# Patient Record
Sex: FEMALE | Race: WHITE | Employment: FULL TIME | URBAN - METROPOLITAN AREA
[De-identification: names, ages, dates, MRNs, and addresses within clinical notes are randomized per-mention and may not be internally consistent; named-entity substitution may affect disease eponyms.]

---

## 2020-08-07 ENCOUNTER — OFFICE VISIT (OUTPATIENT)
Dept: SURGERY | Age: 60
End: 2020-08-07
Payer: COMMERCIAL

## 2020-08-07 ENCOUNTER — HOSPITAL ENCOUNTER (OUTPATIENT)
Age: 60
Discharge: HOME | End: 2020-08-09
Payer: COMMERCIAL

## 2020-08-07 VITALS
HEIGHT: 62 IN | WEIGHT: 195 LBS | RESPIRATION RATE: 20 BRPM | SYSTOLIC BLOOD PRESSURE: 138 MMHG | DIASTOLIC BLOOD PRESSURE: 88 MMHG | OXYGEN SATURATION: 98 % | BODY MASS INDEX: 35.88 KG/M2 | TEMPERATURE: 97.7 F | HEART RATE: 75 BPM

## 2020-08-07 PROCEDURE — 11440 EXC FACE-MM B9+MARG 0.5 CM/<: CPT | Performed by: PLASTIC SURGERY

## 2020-08-07 PROCEDURE — G8427 DOCREV CUR MEDS BY ELIG CLIN: HCPCS | Performed by: PLASTIC SURGERY

## 2020-08-07 PROCEDURE — G8417 CALC BMI ABV UP PARAM F/U: HCPCS | Performed by: PLASTIC SURGERY

## 2020-08-07 PROCEDURE — 3017F COLORECTAL CA SCREEN DOC REV: CPT | Performed by: PLASTIC SURGERY

## 2020-08-07 PROCEDURE — 88305 TISSUE EXAM BY PATHOLOGIST: CPT

## 2020-08-07 PROCEDURE — 99204 OFFICE O/P NEW MOD 45 MIN: CPT | Performed by: PLASTIC SURGERY

## 2020-08-07 PROCEDURE — 1036F TOBACCO NON-USER: CPT | Performed by: PLASTIC SURGERY

## 2020-08-07 PROCEDURE — 88312 SPECIAL STAINS GROUP 1: CPT

## 2020-08-07 RX ORDER — LIDOCAINE HYDROCHLORIDE AND EPINEPHRINE 10; 10 MG/ML; UG/ML
3 INJECTION, SOLUTION INFILTRATION; PERINEURAL ONCE
Status: COMPLETED | OUTPATIENT
Start: 2020-08-07 | End: 2020-08-07

## 2020-08-07 RX ORDER — AMLODIPINE BESYLATE 5 MG/1
5 TABLET ORAL DAILY
COMMUNITY

## 2020-08-07 RX ADMIN — LIDOCAINE HYDROCHLORIDE AND EPINEPHRINE 3 ML: 10; 10 INJECTION, SOLUTION INFILTRATION; PERINEURAL at 13:44

## 2020-08-07 NOTE — PROGRESS NOTES
Department of Plastic Surgery - Adult  Attending Consult Note      CHIEF COMPLAINT:  Lesion of left upper eyelid    History Obtained From:  patient    HISTORY OF PRESENT ILLNESS:                The patient is a 62 y.o. female who presents with pigmented growing lesion of left upper eyelid. The patient states that they first noticed the lesion several months ago. It has  grown in size since they first noticed the lesion. The lesion has  changed in color and has not had discharge or bleeding. The pt ha had the lesion biopsied previously. The patient states that it weighs down her eyelid and causes some irritation. She can definitely see it in her line of vision. Past Medical History:    Past Medical History:   Diagnosis Date    Graves disease     HTN (hypertension)     Hypothyroid      Past Surgical History:    Past Surgical History:   Procedure Laterality Date    HYSTERECTOMY  2010? Current Medications:      Current Outpatient Medications   Medication Sig Dispense Refill    amLODIPine (NORVASC) 5 MG tablet Take 5 mg by mouth daily      methimazole (TAPAZOLE) 5 MG tablet Take 5 mg by mouth 3 times daily 5mg tablet five times weekly       No current facility-administered medications for this visit. Allergies:  Erythromycin-sulfisoxazole; Pcn [penicillins]; and Sulfa antibiotics    Social History:   Social History     Socioeconomic History    Marital status:      Spouse name: Not on file    Number of children: Not on file    Years of education: Not on file    Highest education level: Not on file   Occupational History    Not on file   Social Needs    Financial resource strain: Not on file    Food insecurity     Worry: Not on file     Inability: Not on file    Transportation needs     Medical: Not on file     Non-medical: Not on file   Tobacco Use    Smoking status: Never Smoker    Smokeless tobacco: Never Used   Substance and Sexual Activity    Alcohol use: No    Drug use:  No  Sexual activity: Not on file   Lifestyle    Physical activity     Days per week: Not on file     Minutes per session: Not on file    Stress: Not on file   Relationships    Social connections     Talks on phone: Not on file     Gets together: Not on file     Attends Alevism service: Not on file     Active member of club or organization: Not on file     Attends meetings of clubs or organizations: Not on file     Relationship status: Not on file    Intimate partner violence     Fear of current or ex partner: Not on file     Emotionally abused: Not on file     Physically abused: Not on file     Forced sexual activity: Not on file   Other Topics Concern    Not on file   Social History Narrative    Not on file     Family History:   Family History   Problem Relation Age of Onset    Heart Failure Mother        REVIEW OF SYSTEMS:    CONSTITUTIONAL:  negative for  fevers, chills, sweats and fatigue  EYES: negative for dipolpia or acute vision loss. RESPIRATORY:  negative for  dry cough, cough with sputum, dyspnea, wheezing and chest pain  HENT:negative for pain, headache, difficulty swallowing or nose bleeds. CARDIOVASCULAR:  negative for  chest pain, dyspnea, palpitations, syncope  GASTROINTESTINAL:  negative for nausea, vomiting, change in bowel habits, diarrhea, constipation and abdominal pain  EXTREMITIES: negative for edema  MUSCULOSKELETAL: negative for muscle weakness  SKIN: positive for lesion,negative for itching or rashes.   HEME: negative for easy brusing or bleeding  BEHAVIOR/PSYCH:  negative for poor appetite, increased appetite, decreased sleep and poor concentration      PHYSICAL EXAM:        VITALS:  /88 (Site: Left Upper Arm, Position: Sitting, Cuff Size: Medium Adult)   Pulse 75   Temp 97.7 °F (36.5 °C) (Temporal)   Resp 20   Ht 5' 2\" (1.575 m)   Wt 195 lb (88.5 kg)   SpO2 98%   Breastfeeding No   BMI 35.67 kg/m²   CONSTITUTIONAL:  awake, alert, cooperative, no apparent distress, and appears stated age  EYES: PERRLA, EOMI, no signs of occular infection  LUNGS:  No increased work of breathing, good air exchange  CARDIOVASCULAR:  regular rate and rhythm   EXTREMITIES: no signs of clubbing or cyanosis. MUSCULOSKELETAL: negative for flaccid muscle tone or spastic movements. NEURO: Cranial nerves II-XII grossly intact. No signs of agitated mood. SKIN: Left upper eyelid-  2mm x 3mm,  pigmented in color, irregular border, raised, no signs of bleeding,drainage or infection. Non tender to palpation. verrucous      DATA:    Labs: CBC: No results found for: WBC, RBC, HGB, HCT, MCV, MCH, MCHC, RDW, PLT, MPV  BMP:  No results found for: NA, K, CL, CO2, BUN, LABALBU, CREATININE, CALCIUM, GFRAA, LABGLOM, GLUCOSE    Radiology Review:  No radiology needed at this time  Pathology Review: previous Pathology reviewed       IMPRESSION/RECOMMENDATIONS:        Diagnosis  -) Pigmented raised lesion of uncertain behavior of left upper eyelid, symptomatic    -We will plan to proceed and have the excisionally biopsied.    -The risks, benefits and options were discussed with the pt. The risks included but not limited to pain, bleeding, infection, heavy scarring, damage to surrounding structures, fluid collections, asymmetry, and need for further procedures. All of Her questions were answered to their satisfaction and She agrees to proceed with the procedure.    -After consent was obtained, the area was cleansed with an alcohol swab. Local anesthesia consisting of 1% lidocaine with 1:100,000 epinepherine was injected  surrounding the area. The local was allowed to work. An iris was used to excise the lesion. Simple closure was performed with 5-0 plain gut.   The procedure was performed by Me    I have discussed with the patient that they should make every attempt to follow-up within this time interval in the event that the pathology or radiographic findings require further attention such as surgical or other medical intervention. They voiced complete understanding. The patient was educated to keep the bandage in place and apply bacitracin to the wound site BID. OK to shower at this time. Advised the patient that they can allow soap and water to rinse of the wound site while showering. Once they are done in the shower they are to pat dry the incision site with a clean paper towel. No baths, hot tubs or soaking of the wound site at this time. Pt voices understanding. Photos obtained    FU- 7-14 days      This document is generated, in part, by voice recognition software and thus  syntax and grammatical errors are possible.     Dotty Banks  12:10 PM  8/7/2020

## 2020-08-14 ENCOUNTER — OFFICE VISIT (OUTPATIENT)
Dept: SURGERY | Age: 60
End: 2020-08-14
Payer: COMMERCIAL

## 2020-08-14 VITALS
DIASTOLIC BLOOD PRESSURE: 76 MMHG | HEIGHT: 62 IN | SYSTOLIC BLOOD PRESSURE: 126 MMHG | BODY MASS INDEX: 35.88 KG/M2 | WEIGHT: 195 LBS | TEMPERATURE: 99.3 F | OXYGEN SATURATION: 98 %

## 2020-08-14 PROCEDURE — 99211 OFF/OP EST MAY X REQ PHY/QHP: CPT | Performed by: PHYSICIAN ASSISTANT

## 2020-08-14 PROCEDURE — G8417 CALC BMI ABV UP PARAM F/U: HCPCS | Performed by: PHYSICIAN ASSISTANT

## 2020-08-14 PROCEDURE — G8427 DOCREV CUR MEDS BY ELIG CLIN: HCPCS | Performed by: PHYSICIAN ASSISTANT

## 2020-08-14 NOTE — PROGRESS NOTES
Subjective: Follow up today from shave biopsy left eyelid lesion. Denies fever, nausea, vomiting, leg pain or swelling, pain is absent. The pt states that they have  kept the wound site(s) covered with bacitracin ointment. They voice no complaints. Objective: There were no vitals taken for this visit. Wound: Clean, dry, and intact, no drainage.   No signs of infection, wound healing well      Assessment:    Patient Active Problem List   Diagnosis    Pigmented skin lesion       Labs: CBC: No results found for: WBC, RBC, HGB, HCT, MCV, MCH, MCHC, RDW, PLT, MPV  BMP:  No results found for: NA, K, CL, CO2, BUN, LABALBU, CREATININE, CALCIUM, GFRAA, LABGLOM, GLUCOSE      Pathology Report- [address], [address] [place] [address]               FINAL SURGICAL PATHOLOGY REPORT     NAME:            ARMANDO BUTTS        Date of       08/07/2020                                            Collection:   Medical Record   [id number]              Date of       08/10/2020   Number:                                  Receipt:   Age:  57 Y        Sex:  F                Date          08/13/2020 14:40                                            Reported:   Date Of Birth:   1960   Financial        [id number]             Admitting     IGNACIO LEHMAN   Number:                                  Physician:   Patient          HEPIC                   Ordering      IGNACIO LEHMAN   Location:                                Physician:     Accession Number:  [id number]                                           Additional Physicians:ADRIENNE

## 2021-09-09 NOTE — PROGRESS NOTES
PATIENT NOTIFIED OF TIME CHANGE FOR PROCEDURE TO ARRIVE AT 0830 FOR 0930 PROCEDURE WITH DR. Candy Hopkins.

## 2021-09-10 ENCOUNTER — HOSPITAL ENCOUNTER (OUTPATIENT)
Age: 61
Setting detail: OUTPATIENT SURGERY
Discharge: HOME | End: 2021-09-10
Attending: SURGERY | Admitting: SURGERY
Payer: COMMERCIAL

## 2021-09-10 VITALS
RESPIRATION RATE: 16 BRPM | SYSTOLIC BLOOD PRESSURE: 104 MMHG | BODY MASS INDEX: 38.14 KG/M2 | DIASTOLIC BLOOD PRESSURE: 60 MMHG | OXYGEN SATURATION: 95 % | HEIGHT: 61 IN | HEART RATE: 70 BPM | TEMPERATURE: 98.5 F | WEIGHT: 202 LBS

## 2021-09-10 PROCEDURE — 99153 MOD SED SAME PHYS/QHP EA: CPT | Performed by: SURGERY

## 2021-09-10 PROCEDURE — 7100000010 HC PHASE II RECOVERY - FIRST 15 MIN: Performed by: SURGERY

## 2021-09-10 PROCEDURE — 7100000011 HC PHASE II RECOVERY - ADDTL 15 MIN: Performed by: SURGERY

## 2021-09-10 PROCEDURE — 2580000003 HC RX 258: Performed by: SURGERY

## 2021-09-10 PROCEDURE — 6360000002 HC RX W HCPCS: Performed by: SURGERY

## 2021-09-10 PROCEDURE — 2709999900 HC NON-CHARGEABLE SUPPLY: Performed by: SURGERY

## 2021-09-10 PROCEDURE — 3609027000 HC COLONOSCOPY: Performed by: SURGERY

## 2021-09-10 PROCEDURE — 99152 MOD SED SAME PHYS/QHP 5/>YRS: CPT | Performed by: SURGERY

## 2021-09-10 RX ORDER — SODIUM CHLORIDE 0.9 % (FLUSH) 0.9 %
5-40 SYRINGE (ML) INJECTION EVERY 12 HOURS SCHEDULED
Status: DISCONTINUED | OUTPATIENT
Start: 2021-09-10 | End: 2021-09-10 | Stop reason: HOSPADM

## 2021-09-10 RX ORDER — MIDAZOLAM HYDROCHLORIDE 1 MG/ML
INJECTION INTRAMUSCULAR; INTRAVENOUS PRN
Status: DISCONTINUED | OUTPATIENT
Start: 2021-09-10 | End: 2021-09-10 | Stop reason: ALTCHOICE

## 2021-09-10 RX ORDER — MEPERIDINE HYDROCHLORIDE 50 MG/ML
INJECTION INTRAMUSCULAR; INTRAVENOUS; SUBCUTANEOUS PRN
Status: DISCONTINUED | OUTPATIENT
Start: 2021-09-10 | End: 2021-09-10 | Stop reason: ALTCHOICE

## 2021-09-10 RX ORDER — SODIUM CHLORIDE 0.9 % (FLUSH) 0.9 %
5-40 SYRINGE (ML) INJECTION PRN
Status: DISCONTINUED | OUTPATIENT
Start: 2021-09-10 | End: 2021-09-10 | Stop reason: HOSPADM

## 2021-09-10 RX ORDER — SODIUM CHLORIDE 9 MG/ML
INJECTION, SOLUTION INTRAVENOUS CONTINUOUS
Status: DISCONTINUED | OUTPATIENT
Start: 2021-09-10 | End: 2021-09-10 | Stop reason: HOSPADM

## 2021-09-10 RX ORDER — SODIUM CHLORIDE 9 MG/ML
25 INJECTION, SOLUTION INTRAVENOUS PRN
Status: DISCONTINUED | OUTPATIENT
Start: 2021-09-10 | End: 2021-09-10 | Stop reason: HOSPADM

## 2021-09-10 RX ADMIN — SODIUM CHLORIDE: 9 INJECTION, SOLUTION INTRAVENOUS at 09:01

## 2021-09-10 ASSESSMENT — PAIN SCALES - GENERAL
PAINLEVEL_OUTOF10: 0

## 2021-09-10 ASSESSMENT — PAIN - FUNCTIONAL ASSESSMENT
PAIN_FUNCTIONAL_ASSESSMENT: 0-10
PAIN_FUNCTIONAL_ASSESSMENT: 0-10

## 2021-09-10 NOTE — DISCHARGE INSTR - COC
Continuity of Care Form    Patient Name: Rosie Casper   :  1960  MRN:  [id number]    [address] [place] [address] date:  9/10/2021  Discharge date:  ***    Code Status Order: Full Code   Advance Directives:   Advance Care Flowsheet Documentation       Date/Time Healthcare Directive Type of Healthcare Directive Copy in [address] Agent's Name Healthcare Agent's Phone Number    09/10/21 9699  No, patient does not have an advance directive for healthcare treatment  --  --  --  --  --            Admitting Physician:  Jose Reyes MD  PCP: Celine Cottrell MD    Discharging Nurse: [facility] Unit/Room#: 300 [facility]  Discharging Unit Phone Number: ***    Emergency Contact:   Extended Emergency Contact Information  Primary Emergency Contact: Alex Garcia  Address: [address] [place] [address] Phone: [phone]  Work Phone: [phone]  Mobile Phone: [phone]  Relation: Spouse  Secondary Emergency Contact: [address] Phone: [phone]  Relation: Brother/Sister    Past Surgical History:  Past Surgical History:   Procedure Laterality Date    HYSTERECTOMY  ?        Immunization History:   Immunization History   Administered Date(s) Administered    COVID-19, Moderna, PF, 100mcg/0.5mL 2021, 2021       Active Problems:  Patient Active Problem List   Diagnosis Code    Pigmented skin lesion L81.9       Isolation/Infection:   Isolation            No Isolation          Patient Infection Status       None to display            Nurse Assessment:  Last Vital Signs: /71   Pulse 87   Temp 97.6 °F (36.4 °C) (Temporal)   Resp 18   Ht 5' 1\" (1.549 m)   Wt 202 lb (91.6 kg)   SpO2 97%   BMI 38.17 kg/m²     Last documented pain score (0-10 scale): Pain Level: 0  Last Weight:   Wt Readings from Last 1 Encounters:   09/10/21 202 lb (91.6 kg)     Mental Status:  {IP PT MENTAL STATUS::::0}    IV Access:  [address] IV ACCESS:724596269:::0}    Nursing Mobility/ADLs:  Walking   {CHP DME ADLs:646527406:::0}  Transfer  {CHP DME ADLs:219698920:::0}  Bathing  {CHP DME ADLs:507528242:::0}  Dressing  {CHP DME ADLs:746756484:::0}  Toileting  {CHP DME ADLs:073317346:::0}  Feeding  {CHP DME ADLs:268177820:::0}  Med Admin  {CHP DME ADLs:844004271:::0}  Med Delivery   { CLOVIS MED Delivery:442546708:::0}    Wound Care Documentation and Therapy:        Elimination:  Continence: Bowel: {YES / PD:68075}  Bladder: {YES / QR:23131}  Urinary Catheter: {Urinary Catheter:986614801:::0}   Colostomy/Ileostomy/Ileal Conduit: {YES / XP:70798}       Date of Last BM: ***  No intake or output data in the 24 hours ending 09/10/21 1032  No intake/output data recorded.     Safety Concerns:     [address] Safety Concerns:980505834:::0}    Impairments/Disabilities:      [address] Impairments/Disabilities:730780918:::0}    Nutrition Therapy:  Current Nutrition Therapy:   [address] Diet List:885135655:::0}    Routes of Feeding: {CHP DME Other Feedings:478339886:::0}  Liquids: {Slp liquid thickness:93178}  Daily Fluid Restriction: {CHP DME Yes amt example:062479647:::0}  Last Modified Barium Swallow with Video (Video Swallowing Test): {Done Not Done [facility]:225759119:::8}    Treatments at the Time of Hospital Discharge:   Respiratory Treatments: ***  Oxygen Therapy:  {Therapy; copd oxygen:95072:::0}  Ventilator:    {[facility] Vent List:576175205:::0}    Rehab Therapies: {THERAPEUTIC INTERVENTION:8551448459}  Weight Bearing Status/Restrictions: [address] [employer]  Weight Bearin:::0}  Other Medical Equipment (for information only, NOT a DME order):  {EQUIPMENT:473190873}  Other Treatments: ***    Patient's personal belongings (please select all that are sent with patient):  {CHP DME Belongings:997330110:::0}    RN SIGNATURE:  {Esignature:161751113:::0}    CASE MANAGEMENT/SOCIAL WORK SECTION    Inpatient Status Date: ***    Readmission Risk Assessment Score:  Readmission Risk              Risk of Unplanned Readmission:  0           Discharging to Facility/ Agency   Name:   Address:  Phone:  Fax:    Dialysis Facility (if applicable)   Name:  Address:  Dialysis Schedule:  Phone:  Fax:    / signature: {Esignature:157141598:::0}    PHYSICIAN SECTION    Prognosis: {Prognosis:0389840685:::0}    Condition at Discharge: Sofía Dick Patient Condition:895045482:::0}    Rehab Potential (if transferring to Rehab): {Prognosis:4426375454:::0}    Recommended Labs or Other Treatments After Discharge: ***    Physician Certification: I certify the above information and transfer of Barbie Keller  is necessary for the continuing treatment of the diagnosis listed and that she requires {Admit to Appropriate Level of Care:63581:::0} for {GREATER/LESS:721483838} 30 days.      Update Admission H&P: {CHP DME Changes in HandP:797265812:::0}    PHYSICIAN SIGNATURE:  {Esignature:750825312:::0}

## 2021-09-10 NOTE — BRIEF OP NOTE
Brief Postoperative Note      Patient: Dorota Rebolledo  YOB: 1960  MRN: [id number]    Date of Procedure: 9/10/2021    Pre-Op Diagnosis: SCREENING    Post-Op Diagnosis: Same       Procedure(s):  COLORECTAL CANCER SCREENING, NOT HIGH RISK    Surgeon(s):  Lyric Isbell MD    Assistant:  * No surgical staff found *    Anesthesia: IV Sedation    Estimated Blood Loss (mL): Minimal    Complications: None    Specimens:   * No specimens in log *    Implants:  * No implants in log *      Drains: * No LDAs found *    Findings: severe sigmoid diverticulosis with narrow segment at 30 cm no other pathology    Electronically signed by Lyric Isbell MD on 9/10/2021 at 10:30 AM

## 2021-09-10 NOTE — PRE SEDATION
Sedation Pre-Procedure Note    Patient Name: Emily Noble   YOB: 1960  Room/Bed: [facility] ROOM/NONE  Medical Record Number: [id number]  Date: 9/10/2021   Time: 10:25 AM       Indication:  screening    Consent: I have discussed with the patient and/or the patient representative the indication, alternatives, and the possible risks and/or complications of the planned procedure and the anesthesia methods. The patient and/or patient representative appear to understand and agree to proceed. Vital Signs:   Vitals:    09/10/21 0945   BP: 105/64   Pulse: 89   Resp: 18   Temp:    SpO2:        Past Medical History:   has a past medical history of Graves disease, HTN (hypertension), and Hypothyroid. Past Surgical History:   has a past surgical history that includes Hysterectomy (2010?). Medications:   Scheduled Meds:    sodium chloride flush  5-40 mL IntraVENous 2 times per day     Continuous Infusions:    sodium chloride      sodium chloride 42 mL/hr at 09/10/21 0901     PRN Meds: sodium chloride, sodium chloride flush, meperidine, midazolam  Home Meds:   Prior to Admission medications    Medication Sig Start Date End Date Taking? Authorizing Provider   amLODIPine (NORVASC) 5 MG tablet Take 5 mg by mouth daily   Yes Historical Provider, MD   methimazole (TAPAZOLE) 5 MG tablet Take 5 mg by mouth 3 times daily 5mg tablet five times weekly   Yes Historical Provider, MD     Coumadin Use Last 7 Days:  no  Antiplatelet drug therapy use last 7 days: no  Other anticoagulant use last 7 days: no  Additional Medication Information:  none      Pre-Sedation Documentation and Exam:   I have personally completed a history, physical exam & review of systems for this patient (see notes).     Mallampati Airway Assessment:  Mallampati Class I - (soft palate, fauces, uvula & anterior/posterior tonsillar pillars are visible)    Prior History of Anesthesia Complications:   none    ASA Classification:  Class 2 - A normal healthy patient with mild systemic disease    Sedation/ Anesthesia Plan:   intravenous sedation    Medications Planned:   midazolam (Versed)  intravenously    Patient is an appropriate candidate for plan of sedation: yes    Electronically signed by Petty Christian MD on 9/10/2021 at 10:25 AM

## 2021-09-10 NOTE — DISCHARGE INSTR - DIET

## 2022-05-13 ENCOUNTER — OFFICE VISIT (OUTPATIENT)
Dept: SURGERY | Age: 62
End: 2022-05-13
Payer: COMMERCIAL

## 2022-05-13 VITALS
TEMPERATURE: 97.7 F | OXYGEN SATURATION: 97 % | SYSTOLIC BLOOD PRESSURE: 142 MMHG | HEIGHT: 62 IN | WEIGHT: 197 LBS | BODY MASS INDEX: 36.25 KG/M2 | DIASTOLIC BLOOD PRESSURE: 90 MMHG | HEART RATE: 68 BPM

## 2022-05-13 DIAGNOSIS — L98.9 SKIN LESION: Primary | ICD-10-CM

## 2022-05-13 PROCEDURE — 1036F TOBACCO NON-USER: CPT | Performed by: PLASTIC SURGERY

## 2022-05-13 PROCEDURE — 99212 OFFICE O/P EST SF 10 MIN: CPT | Performed by: PLASTIC SURGERY

## 2022-05-13 PROCEDURE — G8417 CALC BMI ABV UP PARAM F/U: HCPCS | Performed by: PLASTIC SURGERY

## 2022-05-13 PROCEDURE — G8427 DOCREV CUR MEDS BY ELIG CLIN: HCPCS | Performed by: PLASTIC SURGERY

## 2022-05-13 PROCEDURE — 11102 TANGNTL BX SKIN SINGLE LES: CPT | Performed by: PLASTIC SURGERY

## 2022-05-13 PROCEDURE — 3017F COLORECTAL CA SCREEN DOC REV: CPT | Performed by: PLASTIC SURGERY

## 2022-05-13 RX ORDER — LIDOCAINE HYDROCHLORIDE AND EPINEPHRINE 10; 10 MG/ML; UG/ML
3 INJECTION, SOLUTION INFILTRATION; PERINEURAL ONCE
Status: COMPLETED | OUTPATIENT
Start: 2022-05-13 | End: 2022-05-13

## 2022-05-13 RX ADMIN — LIDOCAINE HYDROCHLORIDE AND EPINEPHRINE 3 ML: 10; 10 INJECTION, SOLUTION INFILTRATION; PERINEURAL at 13:28

## 2022-05-13 NOTE — PROGRESS NOTES
Subjective: Follow up today from shave biopsy left eyelid lesion 2 years prior. Denies fever, nausea, vomiting, leg pain or swelling, pain is absent. The patient states the lesion has returned rapidly. She also brings my attention a right anterior neck pruritic lesion which he states started yesterday with circumferential induration.     Objective:    BP (!) 142/90 (Site: Left Upper Arm, Position: Sitting, Cuff Size: Medium Adult)   Pulse 68   Temp 97.7 °F (36.5 °C) (Temporal)   Ht 5' 2\" (1.575 m)   Wt 197 lb (89.4 kg)   SpO2 97%   Breastfeeding No   BMI 36.03 kg/m²       Left eyelid pedunculated lesion measures 2 mm round extending to her eyelashes and encroaching on her visual field 2 mm in length brown in pigment  Right anterior neck no visible lesion is appreciated there is no pain or tenderness on palpation       Assessment:    Patient Active Problem List   Diagnosis    Pigmented skin lesion       Labs: CBC: No results found for: WBC, RBC, HGB, HCT, MCV, MCH, MCHC, RDW, PLT, MPV  BMP:  No results found for: NA, K, CL, CO2, BUN, LABALBU, CREATININE, CALCIUM, GFRAA, LABGLOM, GLUCOSE, GLU      Pathology Report- [address]           [address]       [address]      [facility]   [address]            [address] [facility] [address], [address] [place] [address], [facility] [address]          [facility]                                                          [id number]               FINAL SURGICAL PATHOLOGY REPORT     NAME:            ARMANDO BUTTS        Date of       08/07/2020                                            Collection:   Medical Record   [id number]              Date of       08/10/2020   Number:                                  Receipt:   Age:  57 Y        Sex:  F                Date          08/13/2020 14:40                                            Reported:   Date Of Birth:   1960   Financial        [id number]             Admitting     IGNACIO LEHMAN   Number:                                  Physician:   Patient          ROXI LEHMAN   Location:                                Physician:     Accession Number:  [id number]                                           Additional Physicians:ADRIENNE BATRES       Diagnosis:   Left eyelid, excision: Inflamed keratosis with cutaneous horn and   hemorrhage   PAS stain negative for fungus     Comment:     There is no evidence of malignancy in the tissue submitted   for review. Plan:     Left eyelid rapidly growing lesion. Right anterior neck pruritus. Informed patient since her right anterior neck pruritus began yesterday she should monitor this area and any change she should bring to our attention she can use OTC Benadryl or topical cortisone cream.    As the lesion is encroaching on her eyelid and lashes we will plan for shave biopsy today.    -We will plan to proceed and have the left upper eyelid biopsied.  -The risks, benefits and options were discussed with the pt. The risks included but not limited to pain, bleeding, infection, heavy scarring, damage to surrounding structures, fluid collections, asymmetry, and need for further procedures. All of Her questions were answered to their satisfaction and She agrees to proceed with the procedure.  -After consent was obtained, the area was cleansed with an alcohol swab. Local anesthesia consisting of 1% lidocaine with 1:100,000 epinepherine was injected  surrounding the area. The local was allowed to work. Using a 10-blade the lesion was shaved, hemostasis was obtained and a bandage was placed. The procedure was performed by Me    The patient was educated to keep the bandage in place and apply bacitracin to the wound site BID. OK to shower at this time.   Advised the patient that they can allow soap and water to rinse of the wound site while showering. Once they are done in the shower they are to pat dry the incision site with a clean paper towel. No baths, hot tubs or soaking of the wound site at this time. Pt voices understanding. I have discussed with the patient that they should make every attempt to follow-up within this time interval in the event that the pathology or radiographic findings require further attention such as surgical or other medical intervention. They voiced complete understanding. Photos obtained    The patients history, physical exam, assessment and plan were reviewed in detail with Dr. Jennifer Campos he agrees with assessment and initial plan. Call with signs of infection or fever (Increase in pain, redness, or drainage)  Follow up 1 week      This document is generated, in part, by voice recognition software and thus  syntax and grammatical errors are possible.     Zoran Alberto MD  9:55 AM  5/13/2022

## 2022-05-23 ENCOUNTER — SCHEDULED TELEPHONE ENCOUNTER (OUTPATIENT)
Dept: SURGERY | Age: 62
End: 2022-05-23

## 2022-05-23 DIAGNOSIS — L91.8 ACROCHORDON: Primary | ICD-10-CM

## 2022-05-23 PROCEDURE — 99999 PR OFFICE/OUTPT VISIT,PROCEDURE ONLY: CPT | Performed by: PHYSICIAN ASSISTANT

## 2022-05-23 NOTE — PROGRESS NOTES
Juni Bolton is a 59 y.o. female evaluated via telephone on 5/23/2022. Consent:  She and/or health care decision maker is aware that that she may receive a bill for this telephone service, depending on her insurance coverage, and has provided verbal consent to proceed: Yes    The patient was in the state of [place] during the entirety of the phone conversation. Documentation:  I communicated with the patient and/or health care decision maker about the pathology results from their most recent biopsy.    Details of this discussion including any medical advice provided:     Pathology [address]     [address] [facility]            [address] [facility] 1, [address] [facility] [address], [address] [place] [address]               FINAL SURGICAL PATHOLOGY REPORT     NAME:            ARMANDO BUTTS        Date of       05/13/2022                                            Collection:   Medical Record   [id number]              Date of       05/16/2022   Number:                                  Receipt:   Age:  56 Y        Sex:  F                Date          05/18/2022 16:21                                            Reported:   Date Of Birth:   1960   Financial        [id number]            Admitting     [place]   Number:                                  Physician:   Patient          KANNAN                   Ordering      IGNACIO LEHMAN   Location:                                Physician:     Accession Number:  ELKE-                                           Additional Physicians:ADRIENNE BATRES       Diagnosis:   Skin lesion, left upper eyelid, biopsy: Superficial fragment of   impetiginized, irritated skin tag/acrochordon.                                              Kj Calvillo M.D.   Chriss Jolly                                   (Electronic Signature)     I explained to the patient their pathology results which are benign in nature. I explained to the patient that they can discontinue bacitracin to the biopsy site and allow the wound to heal by secondary intention. They can cover with a Band-Aid for continued wound covering if there is any open granulation tissue. I finally explained to the patient that they can begin scar massage once the wound is well-healed and to keep sunscreen over this when they were out in the sun as to have optimal scarring. Lastly I informed the patient that although the biopsy results are benign this lesion can ultimately return in the future. Explained to the patient should the lesion return the future to monitor and with any changes bring these changes to our attention. FU-PRN        I affirm this is a Patient Initiated Episode with a Patient who has not had a related appointment within my department in the past 7 days or scheduled within the next 24 hours. Patient identification was verified at the start of the visit: Yes    Total Time: minutes: <5 minutes (not billable)    The visit was conducted pursuant to the emergency declaration under the [address] [place] [address], [address] [place] [address] authority and the [employer] and [employer] General Act. Patient identification was verified, and a caregiver was present when appropriate. The patient was located in a state where the provider was credentialed to provide care.     Note: not billable if this call serves to triage the patient into an appointment for the relevant concern      ASAF Bacon

## 2022-08-22 ENCOUNTER — HOSPITAL ENCOUNTER (OUTPATIENT)
Dept: GENERAL RADIOLOGY | Age: 62
Discharge: HOME | End: 2022-08-24
Payer: COMMERCIAL

## 2022-08-22 DIAGNOSIS — Z12.31 VISIT FOR SCREENING MAMMOGRAM: ICD-10-CM

## 2022-08-22 PROCEDURE — 77063 BREAST TOMOSYNTHESIS BI: CPT

## 2022-08-26 ENCOUNTER — CLINICAL DOCUMENTATION (OUTPATIENT)
Dept: GENERAL RADIOLOGY | Age: 62
End: 2022-08-26

## 2025-06-05 ENCOUNTER — HOSPITAL ENCOUNTER (OUTPATIENT)
Dept: GENERAL RADIOLOGY | Age: 65
Discharge: HOME | End: 2025-06-07
Payer: COMMERCIAL

## 2025-06-05 DIAGNOSIS — Z12.31 VISIT FOR SCREENING MAMMOGRAM: ICD-10-CM

## 2025-06-05 PROCEDURE — 77063 BREAST TOMOSYNTHESIS BI: CPT

## (undated) DEVICE — Z DISCONTINUED NO SUB IDED TUBING ETCO2 AD L6.5FT NSL ORAL CVD PRNG NONFLARED TIP OVR

## (undated) DEVICE — DEFENDO AIR WATER SUCTION AND BIOPSY VALVE KIT FOR  OLYMPUS: Brand: DEFENDO AIR/WATER/SUCTION AND BIOPSY VALVE

## (undated) DEVICE — GAUZE,SPONGE,POST-OP,4X3,STRL,LF: Brand: MEDLINE

## (undated) DEVICE — CONNECTOR IRRIGATION AUXILIARY H2O JET W/ PRT MTL THRD HYDR